# Patient Record
Sex: MALE | Race: WHITE | URBAN - METROPOLITAN AREA
[De-identification: names, ages, dates, MRNs, and addresses within clinical notes are randomized per-mention and may not be internally consistent; named-entity substitution may affect disease eponyms.]

---

## 2020-01-20 ENCOUNTER — TRANSFERRED RECORDS (OUTPATIENT)
Dept: HEALTH INFORMATION MANAGEMENT | Facility: CLINIC | Age: 60
End: 2020-01-20

## 2020-01-27 ENCOUNTER — TRANSFERRED RECORDS (OUTPATIENT)
Dept: HEALTH INFORMATION MANAGEMENT | Facility: CLINIC | Age: 60
End: 2020-01-27

## 2020-01-30 ENCOUNTER — TRANSFERRED RECORDS (OUTPATIENT)
Dept: HEALTH INFORMATION MANAGEMENT | Facility: CLINIC | Age: 60
End: 2020-01-30

## 2020-02-02 ENCOUNTER — TRANSFERRED RECORDS (OUTPATIENT)
Dept: HEALTH INFORMATION MANAGEMENT | Facility: CLINIC | Age: 60
End: 2020-02-02

## 2020-02-11 ENCOUNTER — TRANSFERRED RECORDS (OUTPATIENT)
Dept: HEALTH INFORMATION MANAGEMENT | Facility: CLINIC | Age: 60
End: 2020-02-11

## 2020-02-13 ENCOUNTER — TRANSCRIBE ORDERS (OUTPATIENT)
Dept: OTHER | Age: 60
End: 2020-02-13

## 2020-02-13 DIAGNOSIS — C25.9 PANCREATIC ADENOCARCINOMA (H): Primary | ICD-10-CM

## 2020-02-14 NOTE — TELEPHONE ENCOUNTER
ONCOLOGY INTAKE: Records Information      APPT INFORMATION:  Referring provider:  Dr. Vicenta Lezama MD  [facility] Reason for visit/diagnosis:  Pancreatic adenocarcinoma (H) [C25.9]  Has patient been notified of appointment date and time?: No    RECORDS INFORMATION:  Were the records received with the referral (via [employer])? Yes    Has patient been seen for any external appt for this diagnosis? Yes    If yes, where? [facility]   Has patient had any imaging or procedures outside of [place] for this condition? Yes      If Yes, where? [facility]     ADDITIONAL INFORMATION:  Patient is deaf, per clinic a letter is being sent to the patient.

## 2020-02-17 ENCOUNTER — TRANSFERRED RECORDS (OUTPATIENT)
Dept: HEALTH INFORMATION MANAGEMENT | Facility: CLINIC | Age: 60
End: 2020-02-17

## 2020-02-17 NOTE — TELEPHONE ENCOUNTER
Action    Action Taken 2/17/20: Recs from [facility] rec'd, faxed to HIM for upload    -Images from [facility] viewable to PACS    [facility] [id number]  12:25 PM    -2/20-20: Slides from [facility] rec'd, taken to 5th floor lab @ [facility]  1:03 PM    -2/27/20: updated [facility] notes faxed to HIM for upload, also emailed to [facility] abd/pelv 1/20 [facility] also resolved, and now viewable to PACS  9:31 AM       RECORDS STATUS - ALL OTHER DIAGNOSIS      RECORDS RECEIVED FROM: [facility]    DATE RECEIVED:    NOTES STATUS DETAILS   OFFICE NOTE from referring provider Faxed to [facility] 2/17 Dr. Lzeama   OFFICE NOTE from medical oncologist Faxed to HIM 2/17 2/11/20: Dr[facility] Le[facility]   DISCHARGE SUMMARY from hospital     DISCHARGE REPORT from the ER Faxed to [facility] 2/17 2/2/20, 1/20/20   OPERATIVE REPORT     MEDICATION LIST     CLINICAL TRIAL TREATMENTS TO DATE     LABS     PATHOLOGY REPORTS [facility], report faxed to [facility] 2/17, Slides rec'd 2/17 1/27/20: [id number]   ANYTHING RELATED TO DIAGNOSIS Faxed to [facility] 2/17    GENONOMIC TESTING     TYPE:     IMAGING (NEED IMAGES & REPORT)     CT SCANS PACS 1/30/20 - [facility], reports in PACS   MRI PACS 1/30/20 - [facility], reports in PACS   MAMMO     ULTRASOUND     PET

## 2020-02-24 PROCEDURE — 00000346 ZZHCL STATISTIC REVIEW OUTSIDE SLIDES TC 88321: Performed by: INTERNAL MEDICINE

## 2020-02-25 ENCOUNTER — TRANSFERRED RECORDS (OUTPATIENT)
Dept: HEALTH INFORMATION MANAGEMENT | Facility: CLINIC | Age: 60
End: 2020-02-25

## 2020-02-27 LAB — COPATH REPORT: NORMAL

## 2020-02-28 ENCOUNTER — TELEPHONE (OUTPATIENT)
Dept: ONCOLOGY | Facility: CLINIC | Age: 60
End: 2020-02-28

## 2020-02-28 NOTE — TELEPHONE ENCOUNTER
I spoke to Vickie HERNANDEZ at [facility] (Dr Lezama's office/referring provider). They are referring this new pancreatic adeno pt to be seen by our Surgical Oncology team (not medical oncology). I consulted with Kaitlin HERNANDEZ at Surg Onc and was able to switch pt's existing appt with Dr Choudhary (Med Onc) on Thurs 3/5 12pm to Dr Mack (Surg Onc) for the exact same appt time/date.     Pt is deaf, needs , relies on mailed letters to correspond, per Vickie HERNANDEZ. They do not have listed emergency contacts or family member or a working phone# for this pt. Their  in [place] is working with patient to provide support & resources. They will update us as needed; Vickie confirms that pt had first dose of chemo 2/25 ([place]/Abrx) at their location.     Notes from Dr Lezama on 2/25 states that they reviewed the 3/5 appt at [facility] w/ the patient.    Addendum: 3/2/20 per voicemail from [facility] RN Lizz ([phone]), they correspond with pt via letters and in person when he comes for appts only. No phone access, no social support.  at [facility] has met w/ pt and helps to arrange transportation PRN.

## 2020-03-03 ENCOUNTER — TRANSFERRED RECORDS (OUTPATIENT)
Dept: HEALTH INFORMATION MANAGEMENT | Facility: CLINIC | Age: 60
End: 2020-03-03

## 2020-03-05 ENCOUNTER — PRE VISIT (OUTPATIENT)
Dept: ONCOLOGY | Facility: CLINIC | Age: 60
End: 2020-03-05

## 2020-03-05 ENCOUNTER — OFFICE VISIT (OUTPATIENT)
Dept: SURGERY | Facility: CLINIC | Age: 60
End: 2020-03-05
Payer: MEDICARE

## 2020-03-05 VITALS
DIASTOLIC BLOOD PRESSURE: 70 MMHG | OXYGEN SATURATION: 97 % | HEIGHT: 73 IN | TEMPERATURE: 99 F | WEIGHT: 149.4 LBS | SYSTOLIC BLOOD PRESSURE: 107 MMHG | BODY MASS INDEX: 19.8 KG/M2 | HEART RATE: 68 BPM

## 2020-03-05 DIAGNOSIS — C25.9 PANCREATIC ADENOCARCINOMA (H): ICD-10-CM

## 2020-03-05 PROCEDURE — G0463 HOSPITAL OUTPT CLINIC VISIT: HCPCS | Mod: ZF

## 2020-03-05 RX ORDER — ONDANSETRON 8 MG/1
TABLET, FILM COATED ORAL
COMMUNITY
Start: 2020-02-25

## 2020-03-05 RX ORDER — CLONAZEPAM 1 MG/1
TABLET ORAL
COMMUNITY
Start: 2020-01-20

## 2020-03-05 RX ORDER — LORAZEPAM 0.5 MG/1
TABLET ORAL
COMMUNITY
Start: 2020-02-26

## 2020-03-05 ASSESSMENT — MIFFLIN-ST. JEOR: SCORE: 1536.55

## 2020-03-05 ASSESSMENT — PAIN SCALES - GENERAL: PAINLEVEL: NO PAIN (0)

## 2020-03-05 NOTE — NURSING NOTE
"Oncology Rooming Note    March 5, 2020 12:32 PM   Roni Dowell is a 60 year old male who presents for:    Chief Complaint   Patient presents with     New Patient     [facility] NEW; PANCREATIC ADENO     Initial Vitals: /70   Pulse 68   Temp 99  F (37.2  C) (Oral)   Ht 1.854 m (6' 1\")   Wt 67.8 kg (149 lb 6.4 oz)   SpO2 97%   BMI 19.71 kg/m   Estimated body mass index is 19.71 kg/m  as calculated from the following:    Height as of this encounter: 1.854 m (6' 1\").    Weight as of this encounter: 67.8 kg (149 lb 6.4 oz). Body surface area is 1.87 meters squared.  No Pain (0) Comment: Data Unavailable   No LMP for male patient.  Allergies reviewed: Yes  Medications reviewed: Yes    Medications: Medication refills not needed today.  Pharmacy name entered into [employer]: [id number] [place], [place] - [address] [place] [address] AT [facility] [address] [place] [address] [facility]    Clinical concerns: No new concerns.  Dr. Mack was notified.      Kamryn Verma CMA              "

## 2020-03-05 NOTE — LETTER
"3/5/2020       RE: Roni Dowell  [address] [place] [address]     Dear Colleague,    Thank you for referring your patient, Roni Dowell, to the [facility]. Please see a copy of my visit note below.    [facility] - Surgical Oncology    NEW CONSULTATION  Mar 5, 2020    Reason for Visit:    Roni Dowell is a 60 year old male who presents with adenocarcinoma of the head of the pancreas.  He was referred by Dr Choudhary.    Pertinent Oncologic History:  60 M w/ recent diagnosis of adenocarcinoma of the head of the pancreas.  He has undergone work-up including EUS with biopsy showing adenocarcinoma.  PET-CT does not show clear evidence of metastatic disease.  CT scan shows a primary tumor in the head of the pancreas with biliary obstruction.  There is involvement of the beverly-splenic confluence and abutment up to about 180 degrees with the SMA.  He has received a biliary stent and has been started on chemotherapy as of late February.    Pertinent Exam: Abdomen soft, non-tender, and non-distended.  No jaundice or scleral icterus.    HISTORY OF PRESENT ILLNESS: The patient is feeling well and is eating well.  Not having any pain.  He is curious about details of his diagnosis and prognosis.  He strongly values quality of life.    /70   Pulse 68   Temp 99  F (37.2  C) (Oral)   Ht 1.854 m (6' 1\")   Wt 67.8 kg (149 lb 6.4 oz)   SpO2 97%   BMI 19.71 kg/m        Pertinent Work-Up/Findings:    Labs: Bilirubin and alkaline phosphatase mildly elevated on 2/25/2020.    CT (1/20/2020): There is a pancreatic head mass with clear involvement of the portosplenic confluence and abutment with likely involvement of the SMA.    Biopsy: Adenocarcinoma.    Assessment/Counseling/Plan:    Roni Dowell is a 60 year old male with clearly borderline resectable and likely locally advanced adenocarcinoma of the head of the pancreas based on involvement of the beverly-splenic " confluence and probable involvement of the SMA.  I discussed pancreas cancer and its natural history.  I discussed that the best outcomes are achieved with a combination of surgery and chemotherapy when possible.  Unfortunately, I do not believe R0 resection is possible in him at this time, and I also think that resection would be associated with excessive morbidity at the cost of no benefit given his vascular involvement pattern.  I discussed that his primary therapy modality at this point will consist of chemotherapy plus or minus radiation therapy depending on his response to chemotherapy.  I discussed that although it is possible that a dramatic response may make him a surgical candidate in the future, it is not the typical course with treatment and that future surgical resection will be unlikely.  If it is felt by his medical oncologist that his response to therapy is strong and may make him a surgical candidate, we would be happy to review his restaging at that time for potential consideration of curative intent resection.  All questions answered with the assistance of a .  The patient was in agreement with and understanding of the plan.    Total time spent with the patient was 20 minutes, of which more than half was counseling and coordination of care.    Again, thank you for allowing me to participate in the care of your patient.      Sincerely,    Iker Mack MD

## 2020-03-05 NOTE — PROGRESS NOTES
"[facility] - Surgical Oncology    NEW CONSULTATION  Mar 5, 2020    Reason for Visit:    Roni Dowell is a 60 year old male who presents with adenocarcinoma of the head of the pancreas.  He was referred by Dr Choudhary.    Pertinent Oncologic History: 60 M w/ recent diagnosis of adenocarcinoma of the head of the pancreas.  He has undergone work-up including EUS with biopsy showing adenocarcinoma.  PET-CT does not show clear evidence of metastatic disease.  CT scan shows a primary tumor in the head of the pancreas with biliary obstruction.  There is involvement of the beverly-splenic confluence and abutment up to about 180 degrees with the SMA.  He has received a biliary stent and has been started on chemotherapy as of late February.    Pertinent Exam: Abdomen soft, non-tender, and non-distended.  No jaundice or scleral icterus.    HISTORY OF PRESENT ILLNESS: The patient is feeling well and is eating well.  Not having any pain.  He is curious about details of his diagnosis and prognosis.  He strongly values quality of life.    /70   Pulse 68   Temp 99  F (37.2  C) (Oral)   Ht 1.854 m (6' 1\")   Wt 67.8 kg (149 lb 6.4 oz)   SpO2 97%   BMI 19.71 kg/m       Pertinent Work-Up/Findings:    Labs: Bilirubin and alkaline phosphatase mildly elevated on 2/25/2020.    CT (1/20/2020): There is a pancreatic head mass with clear involvement of the portosplenic confluence and abutment with likely involvement of the SMA.    Biopsy: Adenocarcinoma.    Assessment/Counseling/Plan:    Roni Dowell is a 60 year old male with clearly borderline resectable and likely locally advanced adenocarcinoma of the head of the pancreas based on involvement of the beverly-splenic confluence and probable involvement of the SMA.  I discussed pancreas cancer and its natural history.  I discussed that the best outcomes are achieved with a combination of surgery and chemotherapy when possible.  Unfortunately, I do not believe R0 " resection is possible in him at this time, and I also think that resection would be associated with excessive morbidity at the cost of no benefit given his vascular involvement pattern.  I discussed that his primary therapy modality at this point will consist of chemotherapy plus or minus radiation therapy depending on his response to chemotherapy.  I discussed that although it is possible that a dramatic response may make him a surgical candidate in the future, it is not the typical course with treatment and that future surgical resection will be unlikely.  If it is felt by his medical oncologist that his response to therapy is strong and may make him a surgical candidate, we would be happy to review his restaging at that time for potential consideration of curative intent resection.  All questions answered with the assistance of a .  The patient was in agreement with and understanding of the plan.    Total time spent with the patient was 20 minutes, of which more than half was counseling and coordination of care.